# Patient Record
Sex: MALE | Race: ASIAN
[De-identification: names, ages, dates, MRNs, and addresses within clinical notes are randomized per-mention and may not be internally consistent; named-entity substitution may affect disease eponyms.]

---

## 2021-05-22 ENCOUNTER — HOSPITAL ENCOUNTER (OUTPATIENT)
Dept: HOSPITAL 53 - M RAD | Age: 26
End: 2021-05-22
Attending: FAMILY MEDICINE
Payer: COMMERCIAL

## 2021-05-22 DIAGNOSIS — M41.9: Primary | ICD-10-CM

## 2021-05-23 NOTE — REP
INDICATION:

SCOLIOSIS DEFORMITY OF SPINE/OUTPATIENT IN ER WAITING ROOM.



COMPARISON:

None.



TECHNIQUE:

AP upright views of the thoracolumbar spine are provided.



FINDINGS:

No structural vertebral anomaly is seen.  There is no discernible thoracolumbar

curvature.  Pedicles and posterior elements are intact.  Vertebral body heights are

preserved.  No paravertebral lesion is seen.  No bony destructive lesions seen.



IMPRESSION:

Negative scoliosis series.  There is no observable curvature.





<Electronically signed by Kade Whitfield > 05/23/21 [id number]